# Patient Record
Sex: FEMALE | Race: WHITE | NOT HISPANIC OR LATINO
[De-identification: names, ages, dates, MRNs, and addresses within clinical notes are randomized per-mention and may not be internally consistent; named-entity substitution may affect disease eponyms.]

---

## 2021-07-14 PROBLEM — Z00.00 ENCOUNTER FOR PREVENTIVE HEALTH EXAMINATION: Status: ACTIVE | Noted: 2021-07-14

## 2021-07-20 ENCOUNTER — NON-APPOINTMENT (OUTPATIENT)
Age: 61
End: 2021-07-20

## 2021-07-20 ENCOUNTER — APPOINTMENT (OUTPATIENT)
Dept: OPHTHALMOLOGY | Facility: CLINIC | Age: 61
End: 2021-07-20
Payer: COMMERCIAL

## 2021-07-20 PROCEDURE — 92004 COMPRE OPH EXAM NEW PT 1/>: CPT

## 2021-07-20 PROCEDURE — 99072 ADDL SUPL MATRL&STAF TM PHE: CPT

## 2021-07-20 PROCEDURE — 92136 OPHTHALMIC BIOMETRY: CPT

## 2021-07-20 PROCEDURE — 92133 CPTRZD OPH DX IMG PST SGM ON: CPT

## 2021-07-23 ENCOUNTER — APPOINTMENT (OUTPATIENT)
Dept: OPHTHALMOLOGY | Facility: CLINIC | Age: 61
End: 2021-07-23
Payer: COMMERCIAL

## 2021-07-23 ENCOUNTER — NON-APPOINTMENT (OUTPATIENT)
Age: 61
End: 2021-07-23

## 2021-07-23 PROCEDURE — 76514 ECHO EXAM OF EYE THICKNESS: CPT

## 2021-07-23 PROCEDURE — 92133 CPTRZD OPH DX IMG PST SGM ON: CPT

## 2021-07-23 PROCEDURE — 92020 GONIOSCOPY: CPT

## 2021-07-23 PROCEDURE — 92014 COMPRE OPH EXAM EST PT 1/>: CPT

## 2021-07-23 PROCEDURE — 99072 ADDL SUPL MATRL&STAF TM PHE: CPT

## 2021-08-09 ENCOUNTER — APPOINTMENT (OUTPATIENT)
Dept: OPHTHALMOLOGY | Facility: CLINIC | Age: 61
End: 2021-08-09
Payer: COMMERCIAL

## 2021-08-09 ENCOUNTER — NON-APPOINTMENT (OUTPATIENT)
Age: 61
End: 2021-08-09

## 2021-08-09 PROCEDURE — 99441: CPT

## 2021-08-27 ENCOUNTER — NON-APPOINTMENT (OUTPATIENT)
Age: 61
End: 2021-08-27

## 2021-08-27 ENCOUNTER — APPOINTMENT (OUTPATIENT)
Dept: OPHTHALMOLOGY | Facility: CLINIC | Age: 61
End: 2021-08-27
Payer: COMMERCIAL

## 2021-08-27 PROCEDURE — 92014 COMPRE OPH EXAM EST PT 1/>: CPT

## 2021-09-28 ENCOUNTER — NON-APPOINTMENT (OUTPATIENT)
Age: 61
End: 2021-09-28

## 2021-09-28 ENCOUNTER — APPOINTMENT (OUTPATIENT)
Dept: OPHTHALMOLOGY | Facility: CLINIC | Age: 61
End: 2021-09-28
Payer: COMMERCIAL

## 2021-09-28 PROCEDURE — 92014 COMPRE OPH EXAM EST PT 1/>: CPT

## 2021-11-08 ENCOUNTER — APPOINTMENT (OUTPATIENT)
Dept: OPHTHALMOLOGY | Facility: AMBULATORY SURGERY CENTER | Age: 61
End: 2021-11-08

## 2021-11-09 ENCOUNTER — APPOINTMENT (OUTPATIENT)
Dept: OPHTHALMOLOGY | Facility: CLINIC | Age: 61
End: 2021-11-09

## 2021-11-16 ENCOUNTER — APPOINTMENT (OUTPATIENT)
Dept: OPHTHALMOLOGY | Facility: CLINIC | Age: 61
End: 2021-11-16

## 2022-01-07 ENCOUNTER — APPOINTMENT (OUTPATIENT)
Dept: OPHTHALMOLOGY | Facility: CLINIC | Age: 62
End: 2022-01-07

## 2022-06-21 ENCOUNTER — APPOINTMENT (OUTPATIENT)
Dept: OPHTHALMOLOGY | Facility: CLINIC | Age: 62
End: 2022-06-21
Payer: COMMERCIAL

## 2022-06-21 ENCOUNTER — NON-APPOINTMENT (OUTPATIENT)
Age: 62
End: 2022-06-21

## 2022-06-21 PROCEDURE — 92025 CPTRIZED CORNEAL TOPOGRAPHY: CPT

## 2022-06-21 PROCEDURE — 92014 COMPRE OPH EXAM EST PT 1/>: CPT

## 2022-06-21 PROCEDURE — 92134 CPTRZ OPH DX IMG PST SGM RTA: CPT

## 2022-10-05 RX ORDER — PHENYLEPHRINE HCL 2.5 %
1 DROPS OPHTHALMIC (EYE)
Refills: 0 | Status: DISCONTINUED | OUTPATIENT
Start: 2022-10-10 | End: 2022-10-10

## 2022-10-05 RX ORDER — TROPICAMIDE 1 %
1 DROPS OPHTHALMIC (EYE)
Refills: 0 | Status: DISCONTINUED | OUTPATIENT
Start: 2022-10-10 | End: 2022-10-10

## 2022-10-05 RX ORDER — OFLOXACIN 0.3 %
1 DROPS OPHTHALMIC (EYE)
Refills: 0 | Status: DISCONTINUED | OUTPATIENT
Start: 2022-10-10 | End: 2022-10-10

## 2022-10-07 NOTE — ASU PATIENT PROFILE, ADULT - VISION (WITH CORRECTIVE LENSES IF THE PATIENT USUALLY WEARS THEM):
cataract rt eye/Partially impaired: cannot see medication labels or newsprint, but can see obstacles in path, and the surrounding layout; can count fingers at arm's length

## 2022-10-07 NOTE — ASU PATIENT PROFILE, ADULT - NSICDXPASTSURGICALHX_GEN_ALL_CORE_FT
PAST SURGICAL HISTORY:  H/O total mastectomy of right breast     Left wrist fracture with metal plate placement    S/P breast lumpectomy right brest    S/P flap graft from abdomen to right breast

## 2022-10-07 NOTE — ASU PATIENT PROFILE, ADULT - NS PREOP UNDERSTANDS INFO
bring photo id, insurance, vaccination cards, no food from midnight, water till 5AM. Wear comfort clothes, no jewelry./yes

## 2022-10-10 ENCOUNTER — APPOINTMENT (OUTPATIENT)
Dept: OPHTHALMOLOGY | Facility: CLINIC | Age: 62
End: 2022-10-10

## 2022-10-10 ENCOUNTER — APPOINTMENT (OUTPATIENT)
Dept: OPHTHALMOLOGY | Facility: AMBULATORY SURGERY CENTER | Age: 62
End: 2022-10-10

## 2022-10-10 ENCOUNTER — NON-APPOINTMENT (OUTPATIENT)
Age: 62
End: 2022-10-10

## 2022-10-10 ENCOUNTER — OUTPATIENT (OUTPATIENT)
Dept: OUTPATIENT SERVICES | Facility: HOSPITAL | Age: 62
LOS: 1 days | Discharge: HOME | End: 2022-10-10

## 2022-10-10 VITALS
SYSTOLIC BLOOD PRESSURE: 143 MMHG | OXYGEN SATURATION: 99 % | WEIGHT: 143.3 LBS | RESPIRATION RATE: 14 BRPM | DIASTOLIC BLOOD PRESSURE: 93 MMHG | HEIGHT: 65 IN | HEART RATE: 67 BPM

## 2022-10-10 VITALS
SYSTOLIC BLOOD PRESSURE: 121 MMHG | DIASTOLIC BLOOD PRESSURE: 74 MMHG | RESPIRATION RATE: 17 BRPM | TEMPERATURE: 97 F | OXYGEN SATURATION: 96 % | HEART RATE: 73 BPM

## 2022-10-10 DIAGNOSIS — S62.102A FRACTURE OF UNSPECIFIED CARPAL BONE, LEFT WRIST, INITIAL ENCOUNTER FOR CLOSED FRACTURE: Chronic | ICD-10-CM

## 2022-10-10 DIAGNOSIS — Z98.890 OTHER SPECIFIED POSTPROCEDURAL STATES: Chronic | ICD-10-CM

## 2022-10-10 DIAGNOSIS — Z90.11 ACQUIRED ABSENCE OF RIGHT BREAST AND NIPPLE: Chronic | ICD-10-CM

## 2022-10-10 PROBLEM — C50.919 MALIGNANT NEOPLASM OF UNSPECIFIED SITE OF UNSPECIFIED FEMALE BREAST: Chronic | Status: ACTIVE | Noted: 2022-10-07

## 2022-10-10 PROCEDURE — 99024 POSTOP FOLLOW-UP VISIT: CPT

## 2022-10-10 PROCEDURE — 66984 XCAPSL CTRC RMVL W/O ECP: CPT | Mod: RT

## 2022-10-10 DEVICE — LENS IOL TECNIS PROTEC ZCB00 20.5D
Type: IMPLANTABLE DEVICE | Site: RIGHT (RIGHT EYE) | Status: NON-FUNCTIONAL
Removed: 2022-10-10

## 2022-10-10 RX ORDER — ACETAMINOPHEN 500 MG
650 TABLET ORAL ONCE
Refills: 0 | Status: DISCONTINUED | OUTPATIENT
Start: 2022-10-10 | End: 2022-10-10

## 2022-10-10 NOTE — OPERATIVE REPORT - OPERATIVE RPOSRT DETAILS
DATE OF PROCEDURE: 10/10/22    SURGEON: HAYDER BLANCHARD M.D.    ASSISTANT: Suyeon Yu MD    PRE-OP DIAGNOSIS: Cataract, right eye    POST-OP DIAGNOSIS: Same    ANESTHESIA: MAC    PROCEDURE: Phacoemulsification with posterior chamber lens implant, right eye    SPECIMEN/TISSUE REMOVED: None    COMPLICATIONS: None    The patient was brought to the operating room and the patient was prepped and draped in the usual sterile fashion, including Betadine drops in the eye.  A drop of topical anesthetic was placed in the eye.  A lid speculum was placed between the lids of the right eye.  A paracentesis was made superiorly.  Intracameral preservative free lidocaine was instilled in the anterior chamber.   The anterior chamber was reformed with viscoelastic.  A clear cornea temporal incision was created using a 2.4 mm metal bladed keratome.  A continuous curvilinear capsulorhexis was started with a cystotome and completed with capsulorhexis forceps.  The lens was hydrodissected with BSS.  The lens was then phacoemulsified using a phaco chop technique.  Residual cortical material was removed using automated irrigation and aspiration.  The capsular bag was reformed using a viscoelastic.  A ZCB00 +20.50 lens was inserted into the bag.  Symmetric capsular bag fixation was confirmed.  The remaining viscoelastic was removed with automated irrigation and aspiration.  The wounds were hydrated and checked to be water tight.  The lid speculum was removed.  Ofloxacin was instilled in the eye.  The patient left the OR in stable condition having tolerated the procedure well.  Hayder HERRERA was present throughout the case.

## 2022-10-11 ENCOUNTER — NON-APPOINTMENT (OUTPATIENT)
Age: 62
End: 2022-10-11

## 2022-10-11 ENCOUNTER — APPOINTMENT (OUTPATIENT)
Dept: OPHTHALMOLOGY | Facility: CLINIC | Age: 62
End: 2022-10-11

## 2022-10-11 PROCEDURE — 99024 POSTOP FOLLOW-UP VISIT: CPT

## 2022-10-18 ENCOUNTER — NON-APPOINTMENT (OUTPATIENT)
Age: 62
End: 2022-10-18

## 2022-10-18 ENCOUNTER — APPOINTMENT (OUTPATIENT)
Dept: OPHTHALMOLOGY | Facility: CLINIC | Age: 62
End: 2022-10-18

## 2022-10-18 PROCEDURE — 99024 POSTOP FOLLOW-UP VISIT: CPT

## 2022-10-21 NOTE — ASU PATIENT PROFILE, ADULT - NS PREOP UNDERSTANDS INFO
no solids after 12am, clears allowed up to 5am, bring id and insurance card, wear comfortable clothing, no valuable or jewelry/yes

## 2022-10-21 NOTE — ASU PATIENT PROFILE, ADULT - NSICDXPASTSURGICALHX_GEN_ALL_CORE_FT
PAST SURGICAL HISTORY:  H/O total mastectomy of right breast     Left wrist fracture with metal plate placement    S/P breast lumpectomy right brest    S/P cataract surgery RIGHT EYE    S/P flap graft from abdomen to right breast

## 2022-10-21 NOTE — ASU PATIENT PROFILE, ADULT - FALL HARM RISK - UNIVERSAL INTERVENTIONS
Bed in lowest position, wheels locked, appropriate side rails in place/Call bell, personal items and telephone in reach/Instruct patient to call for assistance before getting out of bed or chair/Non-slip footwear when patient is out of bed/[place] to call system/Physically safe environment - no spills, clutter or unnecessary equipment/Purposeful Proactive Rounding/Room/bathroom lighting operational, light cord in reach

## 2022-10-21 NOTE — ASU PATIENT PROFILE, ADULT - NS PRO LACT YNNA
need for outpatient follow-up/return to ED if symptoms worsen, persist or questions arise/lab results
no

## 2022-10-24 ENCOUNTER — OUTPATIENT (OUTPATIENT)
Dept: OUTPATIENT SERVICES | Facility: HOSPITAL | Age: 62
LOS: 1 days | Discharge: HOME | End: 2022-10-24

## 2022-10-24 ENCOUNTER — TRANSCRIPTION ENCOUNTER (OUTPATIENT)
Age: 62
End: 2022-10-24

## 2022-10-24 ENCOUNTER — NON-APPOINTMENT (OUTPATIENT)
Age: 62
End: 2022-10-24

## 2022-10-24 ENCOUNTER — APPOINTMENT (OUTPATIENT)
Dept: OPHTHALMOLOGY | Facility: CLINIC | Age: 62
End: 2022-10-24

## 2022-10-24 ENCOUNTER — APPOINTMENT (OUTPATIENT)
Dept: OPHTHALMOLOGY | Facility: AMBULATORY SURGERY CENTER | Age: 62
End: 2022-10-24

## 2022-10-24 VITALS
SYSTOLIC BLOOD PRESSURE: 130 MMHG | DIASTOLIC BLOOD PRESSURE: 89 MMHG | RESPIRATION RATE: 16 BRPM | OXYGEN SATURATION: 97 % | HEART RATE: 72 BPM | TEMPERATURE: 98 F

## 2022-10-24 VITALS
TEMPERATURE: 98 F | SYSTOLIC BLOOD PRESSURE: 149 MMHG | DIASTOLIC BLOOD PRESSURE: 96 MMHG | HEIGHT: 65 IN | HEART RATE: 71 BPM | WEIGHT: 145.95 LBS | OXYGEN SATURATION: 100 % | RESPIRATION RATE: 14 BRPM

## 2022-10-24 DIAGNOSIS — Z98.890 OTHER SPECIFIED POSTPROCEDURAL STATES: Chronic | ICD-10-CM

## 2022-10-24 DIAGNOSIS — Z90.11 ACQUIRED ABSENCE OF RIGHT BREAST AND NIPPLE: Chronic | ICD-10-CM

## 2022-10-24 DIAGNOSIS — Z98.49 CATARACT EXTRACTION STATUS, UNSPECIFIED EYE: Chronic | ICD-10-CM

## 2022-10-24 DIAGNOSIS — S62.102A FRACTURE OF UNSPECIFIED CARPAL BONE, LEFT WRIST, INITIAL ENCOUNTER FOR CLOSED FRACTURE: Chronic | ICD-10-CM

## 2022-10-24 PROCEDURE — 99024 POSTOP FOLLOW-UP VISIT: CPT

## 2022-10-24 PROCEDURE — 66984 XCAPSL CTRC RMVL W/O ECP: CPT | Mod: 79,LT

## 2022-10-24 DEVICE — LENS IOL TECNIS PROTEC ZCB00 22.0D
Type: IMPLANTABLE DEVICE | Site: LEFT | Status: NON-FUNCTIONAL
Removed: 2022-10-24

## 2022-10-24 RX ORDER — PHENYLEPHRINE HCL 2.5 %
1 DROPS OPHTHALMIC (EYE)
Refills: 0 | Status: COMPLETED | OUTPATIENT
Start: 2022-10-24 | End: 2022-10-24

## 2022-10-24 RX ORDER — TROPICAMIDE 1 %
1 DROPS OPHTHALMIC (EYE)
Refills: 0 | Status: COMPLETED | OUTPATIENT
Start: 2022-10-24 | End: 2022-10-24

## 2022-10-24 RX ORDER — OFLOXACIN 0.3 %
1 DROPS OPHTHALMIC (EYE)
Refills: 0 | Status: COMPLETED | OUTPATIENT
Start: 2022-10-24 | End: 2022-10-24

## 2022-10-24 RX ADMIN — Medication 1 DROP(S): at 06:54

## 2022-10-24 RX ADMIN — Medication 1 DROP(S): at 07:04

## 2022-10-24 RX ADMIN — Medication 1 DROP(S): at 06:59

## 2022-10-24 NOTE — OPERATIVE REPORT - OPERATIVE RPOSRT DETAILS
DATE OF PROCEDURE: 10/24/22    SURGEON: HAYDER BLANCHARD M.D.    ASSISTANT: Suyeon Yu MD    PRE-OP DIAGNOSIS: Cataract, left eye    POST-OP DIAGNOSIS: Same    ANESTHESIA: MAC    PROCEDURE: Phaocemulsification with posterior chamber lens implant, left eye    SPECIMEN/TISSUE REMOVED: None    COMPLICATIONS: None    DESCRIPTION OF PROCEDURE:  The patient was brought to the operating room and the patient was prepped and draped in the usual sterile fashion, including Betadine drops in the eye.  A drop of topical anesthetic was placed in the eye.  A lid speculum was placed between the lids of the left eye.  A paracentesis was made inferiorly.  Intracameral preservative free lidocaine was instilled in the anterior chamber.  The anterior chamber was reformed with viscoelastic.  A clear cornea temporal incision was created using a 2.4mm metal bladed keratome.  A continuous curvilinear capsulorhexis was started with a cystotome and completed with capsulorhexis forceps.  The lens was hydrodissected with BSS.  The lens was then phacoemulsified using a phaco chop technique.  Residual cortical material was removed using automated irrigation and aspiration.  The capsular bag was reformed using a viscoelastic.  A ZCB00 +22.00 lens was inserted into the bag.  Symmetric capsular bag fixation was confirmed.  The remaining viscoelastic was removed with automated irrigation and aspiration.  The wounds were hydrated and checked to be water tight.  The lid speculum was removed.  Ofloxacin was instilled in the eye.  The patient left the OR in stable condition having tolerated the procedure well.  IHayder was present throughout the case. DATE OF PROCEDURE: 10/24/22    SURGEON: HAYDER BLANCHARD M.D.    ASSISTANT: Suyeon Yu MD    PRE-OP DIAGNOSIS: Cataract, left eye    POST-OP DIAGNOSIS: Same    ANESTHESIA: MAC    PROCEDURE: Phaocemulsification with posterior chamber lens implant, left eye    SPECIMEN/TISSUE REMOVED: None    COMPLICATIONS: None    DESCRIPTION OF PROCEDURE:  The patient was brought to the operating room and the patient was prepped and draped in the usual sterile fashion, including Betadine drops in the eye.  A drop of topical anesthetic was placed in the eye.  A lid speculum was placed between the lids of the left eye.  A paracentesis was made inferiorly.  Intracameral preservative free lidocaine was instilled in the anterior chamber.  The anterior chamber was reformed with viscoelastic.  A clear cornea temporal incision was created using a 2.4mm metal bladed keratome.  A continuous curvilinear capsulorhexis was started with a cystotome and completed with capsulorhexis forceps.  The lens was hydrodissected with BSS.  The lens was then phacoemulsified using a phaco chop technique.  Residual cortical material was removed using automated irrigation and aspiration.  The capsular bag was reformed using a viscoelastic.  A ZCB00 +22.00 lens was inserted into the bag.  Symmetric capsular bag fixation was confirmed.  The remaining viscoelastic was removed with automated irrigation and aspiration. 1 10-0 nylon suture was placed in the temporal incision. The wounds were hydrated and checked to be water tight.  The lid speculum was removed.  Ofloxacin was instilled in the eye.  The patient left the OR in stable condition having tolerated the procedure well.  IHayder was present throughout the case.

## 2022-10-24 NOTE — ASU DISCHARGE PLAN (ADULT/PEDIATRIC) - NS MD DC FALL RISK RISK
For information on Fall & Injury Prevention, visit: [email or web address] OR  [email or web address] OR  https://www.cdc.gov/steadi/patient.html

## 2022-10-25 ENCOUNTER — NON-APPOINTMENT (OUTPATIENT)
Age: 62
End: 2022-10-25

## 2022-10-25 ENCOUNTER — APPOINTMENT (OUTPATIENT)
Dept: OPHTHALMOLOGY | Facility: CLINIC | Age: 62
End: 2022-10-25

## 2022-10-25 PROCEDURE — 99024 POSTOP FOLLOW-UP VISIT: CPT

## 2022-11-01 ENCOUNTER — NON-APPOINTMENT (OUTPATIENT)
Age: 62
End: 2022-11-01

## 2022-11-01 ENCOUNTER — APPOINTMENT (OUTPATIENT)
Dept: OPHTHALMOLOGY | Facility: CLINIC | Age: 62
End: 2022-11-01

## 2022-11-01 PROCEDURE — 99024 POSTOP FOLLOW-UP VISIT: CPT

## 2022-11-22 ENCOUNTER — NON-APPOINTMENT (OUTPATIENT)
Age: 62
End: 2022-11-22

## 2022-11-22 ENCOUNTER — APPOINTMENT (OUTPATIENT)
Dept: OPHTHALMOLOGY | Facility: CLINIC | Age: 62
End: 2022-11-22

## 2022-11-22 PROCEDURE — 99024 POSTOP FOLLOW-UP VISIT: CPT

## 2023-02-14 ENCOUNTER — NON-APPOINTMENT (OUTPATIENT)
Age: 63
End: 2023-02-14

## 2023-02-14 ENCOUNTER — APPOINTMENT (OUTPATIENT)
Dept: OPHTHALMOLOGY | Facility: CLINIC | Age: 63
End: 2023-02-14
Payer: COMMERCIAL

## 2023-02-14 PROCEDURE — 92014 COMPRE OPH EXAM EST PT 1/>: CPT

## 2023-06-27 ENCOUNTER — APPOINTMENT (OUTPATIENT)
Dept: OPHTHALMOLOGY | Facility: CLINIC | Age: 63
End: 2023-06-27
Payer: COMMERCIAL

## 2023-06-27 ENCOUNTER — NON-APPOINTMENT (OUTPATIENT)
Age: 63
End: 2023-06-27

## 2023-06-27 PROCEDURE — 92250 FUNDUS PHOTOGRAPHY W/I&R: CPT

## 2023-06-27 PROCEDURE — 92012 INTRM OPH EXAM EST PATIENT: CPT

## 2023-06-30 NOTE — ASU PATIENT PROFILE, ADULT - VISION (WITH CORRECTIVE LENSES IF THE PATIENT USUALLY WEARS THEM):
Impression: Nonexudative age-related macular degeneration, bilateral, early dry stage: H35.3131.

-positive family HX
-6/30/23 ordered and reviewed MAC OCT: [place], no IRF/SRF, stable from previous OU Plan: Macular degeneration, dry type with stable vision. Continue taking PreserVision PO. Patient advised to RTC immediately if any vision changes occur. cataract left eye/Partially impaired: cannot see medication labels or newsprint, but can see obstacles in path, and the surrounding layout; can count fingers at arm's length

## 2023-08-24 NOTE — ASU PATIENT PROFILE, ADULT - FALL HARM RISK - UNIVERSAL INTERVENTIONS
Yes Yes Yes Yes Yes Yes Yes Yes Bed in lowest position, wheels locked, appropriate side rails in place/Call bell, personal items and telephone in reach/Instruct patient to call for assistance before getting out of bed or chair/Non-slip footwear when patient is out of bed/[place] to call system/Physically safe environment - no spills, clutter or unnecessary equipment/Purposeful Proactive Rounding/Room/bathroom lighting operational, light cord in reach

## (undated) DEVICE — GLV 7.5 PROTEXIS (WHITE)

## (undated) DEVICE — CANNULA IRR ANT CHAMBER 30G

## (undated) DEVICE — MARKER CIONNI TORIC REFERENCE

## (undated) DEVICE — SOL IRR BAL SALT 500ML

## (undated) DEVICE — DRAPE MICROSCOPE KNOB COVER SMALL (2 PCS)

## (undated) DEVICE — KNIFE ALCON PARACENTESIS CLEARCUT SIDEPORT 1MM (YELLOW)

## (undated) DEVICE — KIT CENTURION ANTERIOR

## (undated) DEVICE — GOWN SLEEVES

## (undated) DEVICE — RETRACTOR SYNERGETICS IRIS FLEXIBLE DISP

## (undated) DEVICE — SPEAR CELLULOSE 40410

## (undated) DEVICE — CANNULA IRR AC CHAMBER 8MM BEND